# Patient Record
Sex: FEMALE | URBAN - METROPOLITAN AREA
[De-identification: names, ages, dates, MRNs, and addresses within clinical notes are randomized per-mention and may not be internally consistent; named-entity substitution may affect disease eponyms.]

---

## 2020-01-27 ENCOUNTER — APPOINTMENT (RX ONLY)
Dept: URBAN - METROPOLITAN AREA CLINIC 176 | Facility: CLINIC | Age: 70
Setting detail: DERMATOLOGY
End: 2020-01-27

## 2020-01-27 DIAGNOSIS — L81.4 OTHER MELANIN HYPERPIGMENTATION: ICD-10-CM

## 2020-01-27 DIAGNOSIS — D17 BENIGN LIPOMATOUS NEOPLASM: ICD-10-CM

## 2020-01-27 DIAGNOSIS — L72.0 EPIDERMAL CYST: ICD-10-CM

## 2020-01-27 DIAGNOSIS — L82.1 OTHER SEBORRHEIC KERATOSIS: ICD-10-CM

## 2020-01-27 DIAGNOSIS — Z85.828 PERSONAL HISTORY OF OTHER MALIGNANT NEOPLASM OF SKIN: ICD-10-CM

## 2020-01-27 DIAGNOSIS — L57.8 OTHER SKIN CHANGES DUE TO CHRONIC EXPOSURE TO NONIONIZING RADIATION: ICD-10-CM

## 2020-01-27 DIAGNOSIS — L73.8 OTHER SPECIFIED FOLLICULAR DISORDERS: ICD-10-CM

## 2020-01-27 DIAGNOSIS — D18.0 HEMANGIOMA: ICD-10-CM

## 2020-01-27 DIAGNOSIS — I78.8 OTHER DISEASES OF CAPILLARIES: ICD-10-CM

## 2020-01-27 DIAGNOSIS — D485 NEOPLASM OF UNCERTAIN BEHAVIOR OF SKIN: ICD-10-CM

## 2020-01-27 DIAGNOSIS — D22 MELANOCYTIC NEVI: ICD-10-CM

## 2020-01-27 PROBLEM — D22.5 MELANOCYTIC NEVI OF TRUNK: Status: ACTIVE | Noted: 2020-01-27

## 2020-01-27 PROBLEM — D17.21 BENIGN LIPOMATOUS NEOPLASM OF SKIN AND SUBCUTANEOUS TISSUE OF RIGHT ARM: Status: ACTIVE | Noted: 2020-01-27

## 2020-01-27 PROBLEM — D23.5 OTHER BENIGN NEOPLASM OF SKIN OF TRUNK: Status: ACTIVE | Noted: 2020-01-27

## 2020-01-27 PROBLEM — D48.5 NEOPLASM OF UNCERTAIN BEHAVIOR OF SKIN: Status: ACTIVE | Noted: 2020-01-27

## 2020-01-27 PROBLEM — D18.01 HEMANGIOMA OF SKIN AND SUBCUTANEOUS TISSUE: Status: ACTIVE | Noted: 2020-01-27

## 2020-01-27 PROCEDURE — ? COUNSELING

## 2020-01-27 PROCEDURE — ? SUNSCREEN RECOMMENDATIONS

## 2020-01-27 PROCEDURE — 99203 OFFICE O/P NEW LOW 30 MIN: CPT | Mod: 25

## 2020-01-27 PROCEDURE — 11300 SHAVE SKIN LESION 0.5 CM/<: CPT

## 2020-01-27 PROCEDURE — ? SHAVE REMOVAL

## 2020-01-27 ASSESSMENT — LOCATION ZONE DERM
LOCATION ZONE: ARM
LOCATION ZONE: TRUNK
LOCATION ZONE: FACE
LOCATION ZONE: EAR
LOCATION ZONE: LEG
LOCATION ZONE: NECK

## 2020-01-27 ASSESSMENT — LOCATION SIMPLE DESCRIPTION DERM
LOCATION SIMPLE: RIGHT UPPER ARM
LOCATION SIMPLE: LEFT CHEEK
LOCATION SIMPLE: ABDOMEN
LOCATION SIMPLE: LEFT UPPER BACK
LOCATION SIMPLE: NECK
LOCATION SIMPLE: RIGHT SHOULDER
LOCATION SIMPLE: RIGHT EAR
LOCATION SIMPLE: LEFT UPPER ARM
LOCATION SIMPLE: CHEST
LOCATION SIMPLE: RIGHT KNEE
LOCATION SIMPLE: RIGHT UPPER BACK
LOCATION SIMPLE: RIGHT PRETIBIAL REGION
LOCATION SIMPLE: RIGHT CHEEK
LOCATION SIMPLE: LEFT PRETIBIAL REGION
LOCATION SIMPLE: RIGHT CLAVICULAR SKIN

## 2020-01-27 ASSESSMENT — LOCATION DETAILED DESCRIPTION DERM
LOCATION DETAILED: LEFT PROXIMAL PRETIBIAL REGION
LOCATION DETAILED: RIGHT CENTRAL LATERAL NECK
LOCATION DETAILED: RIGHT MEDIAL UPPER BACK
LOCATION DETAILED: LEFT INFERIOR CENTRAL MALAR CHEEK
LOCATION DETAILED: RIGHT INFERIOR CENTRAL MALAR CHEEK
LOCATION DETAILED: LEFT ANTERIOR LATERAL PROXIMAL UPPER ARM
LOCATION DETAILED: RIGHT MEDIAL SUPERIOR CHEST
LOCATION DETAILED: RIGHT LATERAL SUPERIOR CHEST
LOCATION DETAILED: LEFT ANTERIOR PROXIMAL UPPER ARM
LOCATION DETAILED: LEFT SUPERIOR UPPER BACK
LOCATION DETAILED: RIGHT SUPERIOR HELIX
LOCATION DETAILED: RIGHT MID-UPPER BACK
LOCATION DETAILED: LEFT LATERAL ABDOMEN
LOCATION DETAILED: STERNAL NOTCH
LOCATION DETAILED: RIGHT CLAVICULAR SKIN
LOCATION DETAILED: RIGHT ANTERIOR DISTAL UPPER ARM
LOCATION DETAILED: RIGHT PROXIMAL PRETIBIAL REGION
LOCATION DETAILED: LEFT ANTERIOR DISTAL UPPER ARM
LOCATION DETAILED: RIGHT SUPERIOR MEDIAL UPPER BACK
LOCATION DETAILED: LEFT LATERAL SUPERIOR CHEST
LOCATION DETAILED: RIGHT ANTERIOR SHOULDER
LOCATION DETAILED: EPIGASTRIC SKIN
LOCATION DETAILED: UPPER STERNUM
LOCATION DETAILED: RIGHT RIB CAGE
LOCATION DETAILED: RIGHT ANTERIOR PROXIMAL UPPER ARM
LOCATION DETAILED: RIGHT KNEE

## 2020-01-27 NOTE — PROCEDURE: SHAVE REMOVAL
Biopsy Method: Dermablade
Post-Care Instructions: I reviewed with the patient in detail post-care instructions. Patient is to keep the biopsy site dry overnight, cleanse in morning and apply vaseline and a bandage daily x 1 week.
Medical Necessity Clause: This procedure was medically necessary because the lesion that was treated was:
Medical Necessity Information: It is in your best interest to select a reason for this procedure from the list below. All of these items fulfill various CMS LCD requirements except the new and changing color options.
Notification Instructions: Patient will be notified of biopsy results. However, patient instructed to call the office if not contacted within 2 weeks.
Hemostasis: Drysol
Add Variable For Additional Medical Justification: No
Detail Level: Detailed
Size Of Lesion In Cm (Required): 0.4
Consent was obtained from the patient. The risks and benefits to therapy were discussed in detail. Specifically, the risks of infection, scarring, bleeding, prolonged wound healing, incomplete removal, allergy to anesthesia, nerve injury and recurrence were addressed. Prior to the procedure, the treatment site was clearly identified and confirmed by the patient. All components of Universal Protocol/PAUSE Rule completed.
Path Notes (To The Dermatopathologist): Please check margins.
Billing Type: Third-Party Bill
Render Post-Care Instructions In Note?: yes
Lab: [id number]
Anesthesia Type: 1% lidocaine with epinephrine
Wound Care: Polysporin ointment
Lab Facility: [id number]

## 2021-02-03 ENCOUNTER — APPOINTMENT (RX ONLY)
Dept: URBAN - METROPOLITAN AREA CLINIC 176 | Facility: CLINIC | Age: 71
Setting detail: DERMATOLOGY
End: 2021-02-03

## 2021-02-03 VITALS — HEIGHT: 62.5 IN | WEIGHT: 122 LBS

## 2021-02-03 DIAGNOSIS — L81.4 OTHER MELANIN HYPERPIGMENTATION: ICD-10-CM

## 2021-02-03 DIAGNOSIS — L73.8 OTHER SPECIFIED FOLLICULAR DISORDERS: ICD-10-CM

## 2021-02-03 DIAGNOSIS — D18.0 HEMANGIOMA: ICD-10-CM

## 2021-02-03 DIAGNOSIS — L72.0 EPIDERMAL CYST: ICD-10-CM

## 2021-02-03 DIAGNOSIS — D17 BENIGN LIPOMATOUS NEOPLASM: ICD-10-CM

## 2021-02-03 DIAGNOSIS — D22 MELANOCYTIC NEVI: ICD-10-CM

## 2021-02-03 DIAGNOSIS — I78.8 OTHER DISEASES OF CAPILLARIES: ICD-10-CM

## 2021-02-03 DIAGNOSIS — L57.8 OTHER SKIN CHANGES DUE TO CHRONIC EXPOSURE TO NONIONIZING RADIATION: ICD-10-CM

## 2021-02-03 DIAGNOSIS — L82.1 OTHER SEBORRHEIC KERATOSIS: ICD-10-CM

## 2021-02-03 DIAGNOSIS — Z85.828 PERSONAL HISTORY OF OTHER MALIGNANT NEOPLASM OF SKIN: ICD-10-CM

## 2021-02-03 PROBLEM — D17.21 BENIGN LIPOMATOUS NEOPLASM OF SKIN AND SUBCUTANEOUS TISSUE OF RIGHT ARM: Status: ACTIVE | Noted: 2021-02-03

## 2021-02-03 PROBLEM — D22.5 MELANOCYTIC NEVI OF TRUNK: Status: ACTIVE | Noted: 2021-02-03

## 2021-02-03 PROBLEM — D23.5 OTHER BENIGN NEOPLASM OF SKIN OF TRUNK: Status: ACTIVE | Noted: 2021-02-03

## 2021-02-03 PROBLEM — D18.01 HEMANGIOMA OF SKIN AND SUBCUTANEOUS TISSUE: Status: ACTIVE | Noted: 2021-02-03

## 2021-02-03 PROCEDURE — ? COUNSELING

## 2021-02-03 PROCEDURE — 99213 OFFICE O/P EST LOW 20 MIN: CPT

## 2021-02-03 PROCEDURE — ? BENIGN DESTRUCTION COSMETIC

## 2021-02-03 ASSESSMENT — LOCATION DETAILED DESCRIPTION DERM
LOCATION DETAILED: LEFT MEDIAL MALAR CHEEK
LOCATION DETAILED: LEFT INFERIOR LATERAL MALAR CHEEK
LOCATION DETAILED: LEFT LATERAL SUPERIOR CHEST
LOCATION DETAILED: LEFT LATERAL MALAR CHEEK
LOCATION DETAILED: RIGHT ANTERIOR PROXIMAL UPPER ARM
LOCATION DETAILED: LEFT PROXIMAL PRETIBIAL REGION
LOCATION DETAILED: STERNAL NOTCH
LOCATION DETAILED: RIGHT MID-UPPER BACK
LOCATION DETAILED: LEFT MID PREAURICULAR CHEEK
LOCATION DETAILED: RIGHT SUPERIOR HELIX
LOCATION DETAILED: UPPER STERNUM
LOCATION DETAILED: RIGHT CENTRAL MALAR CHEEK
LOCATION DETAILED: LEFT SUPERIOR FOREHEAD
LOCATION DETAILED: RIGHT PROXIMAL PRETIBIAL REGION
LOCATION DETAILED: LEFT LATERAL ABDOMEN
LOCATION DETAILED: RIGHT LATERAL SUPERIOR CHEST
LOCATION DETAILED: RIGHT KNEE
LOCATION DETAILED: RIGHT CENTRAL LATERAL NECK
LOCATION DETAILED: LEFT SUPERIOR UPPER BACK
LOCATION DETAILED: LEFT ANTERIOR DISTAL UPPER ARM
LOCATION DETAILED: RIGHT ANTERIOR SHOULDER
LOCATION DETAILED: RIGHT MEDIAL MALAR CHEEK
LOCATION DETAILED: RIGHT SUPERIOR MEDIAL FOREHEAD
LOCATION DETAILED: EPIGASTRIC SKIN
LOCATION DETAILED: LEFT LATERAL FOREHEAD
LOCATION DETAILED: RIGHT SUPERIOR MEDIAL UPPER BACK
LOCATION DETAILED: LEFT MEDIAL FOREHEAD
LOCATION DETAILED: LEFT ANTERIOR PROXIMAL UPPER ARM
LOCATION DETAILED: LEFT INFERIOR CENTRAL MALAR CHEEK
LOCATION DETAILED: LEFT ANTERIOR LATERAL PROXIMAL UPPER ARM
LOCATION DETAILED: RIGHT INFERIOR CENTRAL MALAR CHEEK
LOCATION DETAILED: RIGHT MEDIAL SUPERIOR CHEST
LOCATION DETAILED: RIGHT SUPERIOR MEDIAL BUCCAL CHEEK
LOCATION DETAILED: MIDDLE STERNUM
LOCATION DETAILED: GLABELLA
LOCATION DETAILED: RIGHT ANTERIOR DISTAL UPPER ARM
LOCATION DETAILED: RIGHT MEDIAL UPPER BACK
LOCATION DETAILED: RIGHT INFERIOR FOREHEAD
LOCATION DETAILED: RIGHT RIB CAGE
LOCATION DETAILED: LEFT INFERIOR MEDIAL FOREHEAD
LOCATION DETAILED: RIGHT SUPERIOR FOREHEAD

## 2021-02-03 ASSESSMENT — LOCATION SIMPLE DESCRIPTION DERM
LOCATION SIMPLE: RIGHT EAR
LOCATION SIMPLE: ABDOMEN
LOCATION SIMPLE: RIGHT UPPER BACK
LOCATION SIMPLE: LEFT CHEEK
LOCATION SIMPLE: RIGHT FOREHEAD
LOCATION SIMPLE: RIGHT KNEE
LOCATION SIMPLE: GLABELLA
LOCATION SIMPLE: RIGHT CHEEK
LOCATION SIMPLE: LEFT UPPER ARM
LOCATION SIMPLE: LEFT UPPER BACK
LOCATION SIMPLE: LEFT FOREHEAD
LOCATION SIMPLE: CHEST
LOCATION SIMPLE: RIGHT PRETIBIAL REGION
LOCATION SIMPLE: LEFT PRETIBIAL REGION
LOCATION SIMPLE: RIGHT SHOULDER
LOCATION SIMPLE: RIGHT UPPER ARM
LOCATION SIMPLE: NECK

## 2021-02-03 ASSESSMENT — LOCATION ZONE DERM
LOCATION ZONE: ARM
LOCATION ZONE: FACE
LOCATION ZONE: TRUNK
LOCATION ZONE: LEG
LOCATION ZONE: NECK
LOCATION ZONE: EAR

## 2021-02-03 NOTE — PROCEDURE: BENIGN DESTRUCTION COSMETIC
Detail Level: Detailed
Anesthesia Volume In Cc: 0.2
Anesthesia Type: 1% lidocaine with epinephrine
Post-Care Instructions: I reviewed with the patient in detail post-care instructions. Patient is to wear sunprotection, and avoid picking at any of the treated lesions. Pt may apply Vaseline to crusted or scabbing areas.
Price (Use Numbers Only, No Special Characters Or $): 150
Consent: The patient's consent was obtained including but not limited to risks of crusting, scabbing, blistering, scarring, darker or lighter pigmentary change, recurrence, incomplete removal and infection.

## 2021-02-03 NOTE — PROCEDURE: COUNSELING
Detail Level: Generalized
Sunscreen Recommendations: spf 30 broad spectrum, reapply q 90 min
Detail Level: Detailed
Detail Level: Simple
Detail Level: Zone
Patient Specific Counseling (Will Not Stick From Patient To Patient): LN2 no extra charge

## 2021-08-31 ENCOUNTER — APPOINTMENT (RX ONLY)
Dept: URBAN - METROPOLITAN AREA CLINIC 176 | Facility: CLINIC | Age: 71
Setting detail: DERMATOLOGY
End: 2021-08-31

## 2021-08-31 VITALS — HEIGHT: 63 IN | WEIGHT: 125 LBS

## 2021-08-31 DIAGNOSIS — B35.1 TINEA UNGUIUM: ICD-10-CM

## 2021-08-31 DIAGNOSIS — L57.8 OTHER SKIN CHANGES DUE TO CHRONIC EXPOSURE TO NONIONIZING RADIATION: ICD-10-CM | Status: STABLE

## 2021-08-31 PROCEDURE — ? NAIL CLIPPING FOR PAS

## 2021-08-31 PROCEDURE — 99213 OFFICE O/P EST LOW 20 MIN: CPT

## 2021-08-31 PROCEDURE — ? COUNSELING

## 2021-08-31 ASSESSMENT — LOCATION ZONE DERM
LOCATION ZONE: FINGERNAIL
LOCATION ZONE: FACE

## 2021-08-31 ASSESSMENT — LOCATION SIMPLE DESCRIPTION DERM
LOCATION SIMPLE: LEFT CHEEK
LOCATION SIMPLE: RIGHT INDEX FINGERNAIL

## 2021-08-31 ASSESSMENT — LOCATION DETAILED DESCRIPTION DERM
LOCATION DETAILED: LEFT INFERIOR CENTRAL MALAR CHEEK
LOCATION DETAILED: RIGHT INDEX FINGERNAIL

## 2021-08-31 NOTE — PROCEDURE: NAIL CLIPPING FOR PAS
Detail Level: Detailed
Billing Type: Third-Party Bill
Add [id number] To Bill?: No
Lab: [id number]
Lab Facility: [id number]

## 2021-08-31 NOTE — HPI: NAIL DYSTROPHY
How Severe Is It?: moderate
Is This A New Presentation, Or A Follow-Up?: Nail Dystrophy
Additional History: Patient tried terbinafine from pcp and was allergic to it

## 2021-08-31 NOTE — PROCEDURE: COUNSELING
Detail Level: Zone
Detail Level: Detailed
Patient Specific Counseling (Will Not Stick From Patient To Patient): Advised patient to stop messing with her cuticles as it takes 6 months for nails to return back to normal.\\nJUBLIA IF CULTURE POSITIVE- DEVELOPED AFTER DEEP CLEANING HER HOUSE AND USING BLEACH.

## 2021-09-03 ENCOUNTER — RX ONLY (OUTPATIENT)
Age: 71
Setting detail: RX ONLY
End: 2021-09-03

## 2021-09-03 RX ORDER — EFINACONAZOLE 100 MG/ML
SOLUTION TOPICAL
Qty: 4 | Refills: 0 | Status: ERX | COMMUNITY
Start: 2021-09-03

## 2021-09-08 ENCOUNTER — APPOINTMENT (RX ONLY)
Dept: URBAN - METROPOLITAN AREA CLINIC 176 | Facility: CLINIC | Age: 71
Setting detail: DERMATOLOGY
End: 2021-09-08

## 2021-09-08 DIAGNOSIS — B35.1 TINEA UNGUIUM: ICD-10-CM

## 2021-09-08 PROCEDURE — ? ORDER TESTS

## 2021-09-08 NOTE — PROCEDURE: ORDER TESTS
Performing Laboratory: [id number]
Expected Date Of Service: 09/08/2021
Billing Type: Third-Party Bill
Bill For Surgical Tray: no

## 2022-10-17 ENCOUNTER — APPOINTMENT (RX ONLY)
Dept: URBAN - METROPOLITAN AREA CLINIC 176 | Facility: CLINIC | Age: 72
Setting detail: DERMATOLOGY
End: 2022-10-17

## 2022-10-17 VITALS — HEIGHT: 63 IN | WEIGHT: 125 LBS

## 2022-10-17 DIAGNOSIS — L57.8 OTHER SKIN CHANGES DUE TO CHRONIC EXPOSURE TO NONIONIZING RADIATION: ICD-10-CM

## 2022-10-17 DIAGNOSIS — D18.0 HEMANGIOMA: ICD-10-CM

## 2022-10-17 DIAGNOSIS — L71.8 OTHER ROSACEA: ICD-10-CM

## 2022-10-17 DIAGNOSIS — Z85.828 PERSONAL HISTORY OF OTHER MALIGNANT NEOPLASM OF SKIN: ICD-10-CM

## 2022-10-17 DIAGNOSIS — I78.8 OTHER DISEASES OF CAPILLARIES: ICD-10-CM

## 2022-10-17 DIAGNOSIS — L81.4 OTHER MELANIN HYPERPIGMENTATION: ICD-10-CM

## 2022-10-17 DIAGNOSIS — L82.1 OTHER SEBORRHEIC KERATOSIS: ICD-10-CM

## 2022-10-17 DIAGNOSIS — D17 BENIGN LIPOMATOUS NEOPLASM: ICD-10-CM

## 2022-10-17 DIAGNOSIS — B35.1 TINEA UNGUIUM: ICD-10-CM | Status: RESOLVING

## 2022-10-17 DIAGNOSIS — D22 MELANOCYTIC NEVI: ICD-10-CM

## 2022-10-17 PROBLEM — D23.5 OTHER BENIGN NEOPLASM OF SKIN OF TRUNK: Status: ACTIVE | Noted: 2022-10-17

## 2022-10-17 PROBLEM — D22.5 MELANOCYTIC NEVI OF TRUNK: Status: ACTIVE | Noted: 2022-10-17

## 2022-10-17 PROBLEM — D17.21 BENIGN LIPOMATOUS NEOPLASM OF SKIN AND SUBCUTANEOUS TISSUE OF RIGHT ARM: Status: ACTIVE | Noted: 2022-10-17

## 2022-10-17 PROBLEM — D18.01 HEMANGIOMA OF SKIN AND SUBCUTANEOUS TISSUE: Status: ACTIVE | Noted: 2022-10-17

## 2022-10-17 PROCEDURE — ? TREATMENT REGIMEN

## 2022-10-17 PROCEDURE — ? PRESCRIPTION

## 2022-10-17 PROCEDURE — ? COUNSELING

## 2022-10-17 PROCEDURE — ? PHOTO-DOCUMENTATION

## 2022-10-17 PROCEDURE — 99213 OFFICE O/P EST LOW 20 MIN: CPT

## 2022-10-17 RX ORDER — TACROLIMUS 1 MG/G
OINTMENT TOPICAL
Qty: 30 | Refills: 2 | Status: ERX | COMMUNITY
Start: 2022-10-17

## 2022-10-17 RX ADMIN — TACROLIMUS: 1 OINTMENT TOPICAL at 00:00

## 2022-10-17 ASSESSMENT — LOCATION DETAILED DESCRIPTION DERM
LOCATION DETAILED: RIGHT MID-UPPER BACK
LOCATION DETAILED: LEFT ANTERIOR DISTAL UPPER ARM
LOCATION DETAILED: UPPER STERNUM
LOCATION DETAILED: RIGHT SUPERIOR MEDIAL UPPER BACK
LOCATION DETAILED: EPIGASTRIC SKIN
LOCATION DETAILED: RIGHT KNEE
LOCATION DETAILED: RIGHT CENTRAL EYEBROW
LOCATION DETAILED: LEFT INFERIOR LATERAL MALAR CHEEK
LOCATION DETAILED: LEFT LATERAL ABDOMEN
LOCATION DETAILED: RIGHT LATERAL SUPERIOR CHEST
LOCATION DETAILED: LEFT MID PREAURICULAR CHEEK
LOCATION DETAILED: RIGHT ANTERIOR PROXIMAL UPPER ARM
LOCATION DETAILED: RIGHT SUPERIOR HELIX
LOCATION DETAILED: LEFT ANTERIOR PROXIMAL UPPER ARM
LOCATION DETAILED: LEFT MEDIAL FOREHEAD
LOCATION DETAILED: LEFT SUPERIOR UPPER BACK
LOCATION DETAILED: RIGHT CENTRAL LATERAL NECK
LOCATION DETAILED: MIDDLE STERNUM
LOCATION DETAILED: RIGHT INDEX FINGERNAIL
LOCATION DETAILED: LEFT PROXIMAL PRETIBIAL REGION
LOCATION DETAILED: LEFT CENTRAL EYEBROW
LOCATION DETAILED: RIGHT PROXIMAL PRETIBIAL REGION
LOCATION DETAILED: LEFT INFERIOR CENTRAL MALAR CHEEK
LOCATION DETAILED: RIGHT MEDIAL UPPER BACK
LOCATION DETAILED: STERNAL NOTCH
LOCATION DETAILED: RIGHT ANTERIOR SHOULDER
LOCATION DETAILED: LEFT ANTERIOR LATERAL PROXIMAL UPPER ARM
LOCATION DETAILED: RIGHT RIB CAGE
LOCATION DETAILED: RIGHT INFERIOR CENTRAL MALAR CHEEK
LOCATION DETAILED: RIGHT ANTERIOR DISTAL UPPER ARM

## 2022-10-17 ASSESSMENT — LOCATION ZONE DERM
LOCATION ZONE: FINGERNAIL
LOCATION ZONE: EAR
LOCATION ZONE: FACE
LOCATION ZONE: ARM
LOCATION ZONE: LEG
LOCATION ZONE: NECK
LOCATION ZONE: TRUNK

## 2022-10-17 ASSESSMENT — LOCATION SIMPLE DESCRIPTION DERM
LOCATION SIMPLE: RIGHT UPPER ARM
LOCATION SIMPLE: RIGHT CHEEK
LOCATION SIMPLE: LEFT UPPER BACK
LOCATION SIMPLE: RIGHT INDEX FINGERNAIL
LOCATION SIMPLE: RIGHT PRETIBIAL REGION
LOCATION SIMPLE: LEFT CHEEK
LOCATION SIMPLE: RIGHT KNEE
LOCATION SIMPLE: LEFT FOREHEAD
LOCATION SIMPLE: NECK
LOCATION SIMPLE: RIGHT EYEBROW
LOCATION SIMPLE: LEFT UPPER ARM
LOCATION SIMPLE: RIGHT EAR
LOCATION SIMPLE: CHEST
LOCATION SIMPLE: LEFT EYEBROW
LOCATION SIMPLE: RIGHT SHOULDER
LOCATION SIMPLE: RIGHT UPPER BACK
LOCATION SIMPLE: ABDOMEN
LOCATION SIMPLE: LEFT PRETIBIAL REGION

## 2022-10-17 NOTE — PROCEDURE: TREATMENT REGIMEN
Initiate Treatment: Tacrolimus
Plan: Patient does not have a history of lupus. If not better with tacrolimus will switch to Opzelura. Patient can not identify any triggers.
Detail Level: Zone
Plan: Patient states she does not need any refills on Jublia. Advised patient to refrain from manipulating the nails but patient doesn’t do anything to her nails.

## 2022-10-17 NOTE — PROCEDURE: COUNSELING
Detail Level: Zone
Sunscreen Recommendations: spf 30 broad spectrum, reapply q 90 min
Detail Level: Detailed
Detail Level: Simple
Detail Level: Generalized

## 2023-04-12 ENCOUNTER — APPOINTMENT (RX ONLY)
Dept: URBAN - METROPOLITAN AREA CLINIC 176 | Facility: CLINIC | Age: 73
Setting detail: DERMATOLOGY
End: 2023-04-12

## 2023-04-12 VITALS — HEIGHT: 63 IN | WEIGHT: 120 LBS

## 2023-04-12 DIAGNOSIS — L72.3 SEBACEOUS CYST: ICD-10-CM

## 2023-04-12 PROBLEM — D48.5 NEOPLASM OF UNCERTAIN BEHAVIOR OF SKIN: Status: ACTIVE | Noted: 2023-04-12

## 2023-04-12 PROCEDURE — ? PRESCRIPTION MEDICATION MANAGEMENT

## 2023-04-12 PROCEDURE — ? PRESCRIPTION

## 2023-04-12 PROCEDURE — ? INTRALESIONAL KENALOG

## 2023-04-12 PROCEDURE — 11900 INJECT SKIN LESIONS </W 7: CPT

## 2023-04-12 PROCEDURE — ? ORDER TESTS

## 2023-04-12 PROCEDURE — ? COUNSELING

## 2023-04-12 RX ORDER — DOXYCYCLINE HYCLATE 100 MG/1
CAPSULE, GELATIN COATED ORAL BID
Qty: 28 | Refills: 0 | Status: ERX

## 2023-04-12 ASSESSMENT — LOCATION SIMPLE DESCRIPTION DERM: LOCATION SIMPLE: LEFT CHEEK

## 2023-04-12 ASSESSMENT — LOCATION ZONE DERM: LOCATION ZONE: FACE

## 2023-04-12 ASSESSMENT — LOCATION DETAILED DESCRIPTION DERM: LOCATION DETAILED: LEFT SUPERIOR CENTRAL BUCCAL CHEEK

## 2023-04-12 NOTE — PROCEDURE: ORDER TESTS
Performing Laboratory: [id number]
Expected Date Of Service: 04/12/2023
Billing Type: Third-Party Bill
Bill For Surgical Tray: no
Lab Facility: [id number]

## 2023-04-12 NOTE — PROCEDURE: INTRALESIONAL KENALOG
Lot # (Optional): [id number]
How Many Mls Were Removed From The 80 Mg/Ml (5ml) Vial When Preparing The Injectable Solution?: 0
Treatment Number (Optional): 1
Medical Necessity Clause: This procedure was medically necessary because the lesions that were treated were:
Concentration Of Solution Injected (Mg/Ml): 3.0
Include Z78.9 (Other Specified Conditions Influencing Health Status) As An Associated Diagnosis?: No
Detail Level: Detailed
Validate Note Data When Using Inventory: Yes
Expiration Date (Optional): 09/01/2023
Total Volume Injected (Ccs- Only Use Numbers And Decimals): 0.2
Kenalog Preparation: Kenalog
Consent: The risks of atrophy were reviewed with the patient.

## 2023-04-12 NOTE — PROCEDURE: PRESCRIPTION MEDICATION MANAGEMENT
Render In Strict Bullet Format?: No
Detail Level: Zone
Initiate Treatment: Doxycycline 100mg twice daily x 2 weeks

## 2023-05-16 ENCOUNTER — APPOINTMENT (RX ONLY)
Dept: URBAN - METROPOLITAN AREA CLINIC 176 | Facility: CLINIC | Age: 73
Setting detail: DERMATOLOGY
End: 2023-05-16

## 2023-05-16 VITALS — WEIGHT: 120 LBS | HEIGHT: 62 IN

## 2023-05-16 DIAGNOSIS — K13.0 DISEASES OF LIPS: ICD-10-CM

## 2023-05-16 DIAGNOSIS — L72.3 SEBACEOUS CYST: ICD-10-CM

## 2023-05-16 PROBLEM — D48.5 NEOPLASM OF UNCERTAIN BEHAVIOR OF SKIN: Status: ACTIVE | Noted: 2023-05-16

## 2023-05-16 PROCEDURE — ? BIOPSY BY PUNCH METHOD

## 2023-05-16 PROCEDURE — ? PRESCRIPTION MEDICATION MANAGEMENT

## 2023-05-16 PROCEDURE — 99212 OFFICE O/P EST SF 10 MIN: CPT | Mod: 25

## 2023-05-16 PROCEDURE — ? COUNSELING

## 2023-05-16 PROCEDURE — 11104 PUNCH BX SKIN SINGLE LESION: CPT

## 2023-05-16 ASSESSMENT — LOCATION ZONE DERM
LOCATION ZONE: FACE
LOCATION ZONE: LIP

## 2023-05-16 ASSESSMENT — LOCATION SIMPLE DESCRIPTION DERM
LOCATION SIMPLE: RIGHT LIP
LOCATION SIMPLE: LEFT CHEEK

## 2023-05-16 ASSESSMENT — LOCATION DETAILED DESCRIPTION DERM
LOCATION DETAILED: RIGHT SUPERIOR VERMILION LIP
LOCATION DETAILED: LEFT SUPERIOR CENTRAL BUCCAL CHEEK

## 2023-05-16 NOTE — PROCEDURE: BIOPSY BY PUNCH METHOD
Detail Level: Detailed
Was A Bandage Applied: Yes
Punch Size In Mm: 2
Size Of Lesion In Cm (Optional): 0.8
X Size Of Lesion In Cm (Optional): 0
Depth Of Punch Biopsy: dermis
Biopsy Type: H and E
Anesthesia Type: 0.5% lidocaine with 1:400,000 epinephrine
Anesthesia Volume In Cc (Will Not Render If 0): 0.5
Hemostasis: None
Epidermal Sutures: 6-0 Prolene
Wound Care: Petrolatum
Dressing: bandage
Suture Removal: 14 days
Patient Will Remove Sutures At Home?: No
Lab: [id number]
Lab Facility: [id number]
Consent: Written consent was obtained and risks were reviewed including but not limited to scarring, infection, bleeding, scabbing, incomplete removal, nerve damage and allergy to anesthesia.
Post-Care Instructions: I reviewed with the patient in detail post-care instructions. Patient is to keep the biopsy site dry overnight, and then apply bacitracin twice daily until healed. Patient may apply hydrogen peroxide soaks to remove any crusting.
Home Suture Removal Text: Patient was provided a home suture removal kit and will remove their sutures at home.  If they have any questions or difficulties they will call the office.
Notification Instructions: Patient will be notified of biopsy results. However, patient instructed to call the office if not contacted within 2 weeks.
Billing Type: Third-Party Bill
Information: Selecting Yes will display possible errors in your note based on the variables you have selected. This validation is only offered as a suggestion for you. PLEASE NOTE THAT THE VALIDATION TEXT WILL BE REMOVED WHEN YOU FINALIZE YOUR NOTE. IF YOU WANT TO FAX A PRELIMINARY NOTE YOU WILL NEED TO TOGGLE THIS TO 'NO' IF YOU DO NOT WANT IT IN YOUR FAXED NOTE.

## 2023-05-23 ENCOUNTER — APPOINTMENT (RX ONLY)
Dept: URBAN - METROPOLITAN AREA CLINIC 176 | Facility: CLINIC | Age: 73
Setting detail: DERMATOLOGY
End: 2023-05-23

## 2023-05-23 VITALS — HEIGHT: 62 IN | WEIGHT: 120 LBS

## 2023-05-23 DIAGNOSIS — Z48.02 ENCOUNTER FOR REMOVAL OF SUTURES: ICD-10-CM

## 2023-05-23 PROCEDURE — ? SUTURE REMOVAL (GLOBAL PERIOD)

## 2023-05-23 PROCEDURE — 99024 POSTOP FOLLOW-UP VISIT: CPT

## 2023-05-23 ASSESSMENT — LOCATION SIMPLE DESCRIPTION DERM: LOCATION SIMPLE: LEFT CHEEK

## 2023-05-23 ASSESSMENT — LOCATION DETAILED DESCRIPTION DERM: LOCATION DETAILED: LEFT SUPERIOR CENTRAL BUCCAL CHEEK

## 2023-05-23 ASSESSMENT — LOCATION ZONE DERM: LOCATION ZONE: FACE

## 2023-05-23 NOTE — PROCEDURE: SUTURE REMOVAL (GLOBAL PERIOD)
Detail Level: Detailed
Add 04483 Cpt? (Important Note: In 2017 The Use Of 92963 Is Being Tracked By Cms To Determine Future Global Period Reimbursement For Global Periods): yes

## 2023-07-13 ENCOUNTER — APPOINTMENT (RX ONLY)
Dept: URBAN - METROPOLITAN AREA CLINIC 176 | Facility: CLINIC | Age: 73
Setting detail: DERMATOLOGY
End: 2023-07-13

## 2023-07-13 VITALS — HEIGHT: 62 IN | WEIGHT: 120 LBS

## 2023-07-13 DIAGNOSIS — L57.8 OTHER SKIN CHANGES DUE TO CHRONIC EXPOSURE TO NONIONIZING RADIATION: ICD-10-CM

## 2023-07-13 DIAGNOSIS — L71.8 OTHER ROSACEA: ICD-10-CM

## 2023-07-13 PROCEDURE — ? COUNSELING

## 2023-07-13 PROCEDURE — ? PHOTO-DOCUMENTATION

## 2023-07-13 PROCEDURE — ? PRESCRIPTION

## 2023-07-13 PROCEDURE — 99213 OFFICE O/P EST LOW 20 MIN: CPT

## 2023-07-13 PROCEDURE — ? TREATMENT REGIMEN

## 2023-07-13 RX ORDER — DOXYCYCLINE HYCLATE 50 MG/1
TABLET, FILM COATED ORAL
Qty: 90 | Refills: 1 | Status: ERX | COMMUNITY
Start: 2023-07-13

## 2023-07-13 RX ADMIN — DOXYCYCLINE HYCLATE: 50 TABLET, FILM COATED ORAL at 00:00

## 2023-07-13 ASSESSMENT — LOCATION DETAILED DESCRIPTION DERM
LOCATION DETAILED: MIDDLE STERNUM
LOCATION DETAILED: LEFT IRIS
LOCATION DETAILED: LEFT MEDIAL FOREHEAD
LOCATION DETAILED: RIGHT CENTRAL EYEBROW
LOCATION DETAILED: LEFT CENTRAL EYEBROW
LOCATION DETAILED: RIGHT INFERIOR CENTRAL MALAR CHEEK
LOCATION DETAILED: RIGHT LATERAL SCLERA

## 2023-07-13 ASSESSMENT — LOCATION ZONE DERM
LOCATION ZONE: FACE
LOCATION ZONE: CONJUNCTIVA
LOCATION ZONE: TRUNK
LOCATION ZONE: CORNEA

## 2023-07-13 ASSESSMENT — LOCATION SIMPLE DESCRIPTION DERM
LOCATION SIMPLE: RIGHT CHEEK
LOCATION SIMPLE: RIGHT EYE
LOCATION SIMPLE: LEFT FOREHEAD
LOCATION SIMPLE: LEFT EYEBROW
LOCATION SIMPLE: CHEST
LOCATION SIMPLE: RIGHT EYEBROW
LOCATION SIMPLE: LEFT EYE

## 2023-07-13 NOTE — PROCEDURE: TREATMENT REGIMEN
Continue Regimen: Metronidazole gel
Plan: Patient does not have a history of lupus.
Detail Level: Zone

## 2023-07-13 NOTE — PROCEDURE: MIPS QUALITY
Quality 431: Preventive Care And Screening: Unhealthy Alcohol Use - Screening: Patient not identified as an unhealthy alcohol user when screened for unhealthy alcohol use using a systematic screening method
Quality 402: Tobacco Use And Help With Quitting Among Adolescents: Patient screened for tobacco and never smoked
Quality 130: Documentation Of Current Medications In The Medical Record: Current Medications Documented
Detail Level: Detailed
Quality 47: Advance Care Plan: Advance Care Planning discussed and documented; advance care plan or surrogate decision maker documented in the medical record.
Quality 110: Preventive Care And Screening: Influenza Immunization: Influenza Immunization not Administered because Patient Refused.
Quality 111:Pneumonia Vaccination Status For Older Adults: Patient received any pneumococcal conjugate or polysaccharide vaccine on or after their 60th birthday and before the end of the measurement period
Quality 394b: Td/Tdap Immunizations For Adolescents: Patient had one tetanus, diphtheria toxoids and acellular pertussis vaccine (Tdap) on or between the patient's 10th and 13th birthdays.

## 2023-11-29 ENCOUNTER — APPOINTMENT (RX ONLY)
Dept: URBAN - METROPOLITAN AREA CLINIC 176 | Facility: CLINIC | Age: 73
Setting detail: DERMATOLOGY
End: 2023-11-29

## 2023-11-29 DIAGNOSIS — L81.4 OTHER MELANIN HYPERPIGMENTATION: ICD-10-CM

## 2023-11-29 DIAGNOSIS — L57.8 OTHER SKIN CHANGES DUE TO CHRONIC EXPOSURE TO NONIONIZING RADIATION: ICD-10-CM

## 2023-11-29 DIAGNOSIS — M67.4 GANGLION: ICD-10-CM

## 2023-11-29 DIAGNOSIS — Z85.828 PERSONAL HISTORY OF OTHER MALIGNANT NEOPLASM OF SKIN: ICD-10-CM

## 2023-11-29 DIAGNOSIS — I78.8 OTHER DISEASES OF CAPILLARIES: ICD-10-CM

## 2023-11-29 DIAGNOSIS — L82.1 OTHER SEBORRHEIC KERATOSIS: ICD-10-CM

## 2023-11-29 DIAGNOSIS — L60.8 OTHER NAIL DISORDERS: ICD-10-CM | Status: INADEQUATELY CONTROLLED

## 2023-11-29 DIAGNOSIS — L71.8 OTHER ROSACEA: ICD-10-CM

## 2023-11-29 DIAGNOSIS — D22 MELANOCYTIC NEVI: ICD-10-CM

## 2023-11-29 DIAGNOSIS — D17 BENIGN LIPOMATOUS NEOPLASM: ICD-10-CM

## 2023-11-29 DIAGNOSIS — D18.0 HEMANGIOMA: ICD-10-CM

## 2023-11-29 PROBLEM — D22.5 MELANOCYTIC NEVI OF TRUNK: Status: ACTIVE | Noted: 2023-11-29

## 2023-11-29 PROBLEM — M67.40 GANGLION, UNSPECIFIED SITE: Status: ACTIVE | Noted: 2023-11-29

## 2023-11-29 PROBLEM — D18.01 HEMANGIOMA OF SKIN AND SUBCUTANEOUS TISSUE: Status: ACTIVE | Noted: 2023-11-29

## 2023-11-29 PROBLEM — D23.5 OTHER BENIGN NEOPLASM OF SKIN OF TRUNK: Status: ACTIVE | Noted: 2023-11-29

## 2023-11-29 PROBLEM — D17.21 BENIGN LIPOMATOUS NEOPLASM OF SKIN AND SUBCUTANEOUS TISSUE OF RIGHT ARM: Status: ACTIVE | Noted: 2023-11-29

## 2023-11-29 PROCEDURE — ? PRESCRIPTION

## 2023-11-29 PROCEDURE — ? COUNSELING

## 2023-11-29 PROCEDURE — 99214 OFFICE O/P EST MOD 30 MIN: CPT

## 2023-11-29 RX ORDER — DOXYCYCLINE HYCLATE 50 MG/1
TABLET, FILM COATED ORAL
Qty: 90 | Refills: 1 | Status: ERX

## 2023-11-29 RX ORDER — CIPROFLOXACIN 0.5 MG/.25ML
SOLUTION/ DROPS AURICULAR (OTIC)
Qty: 1 | Refills: 2 | Status: ERX | COMMUNITY
Start: 2023-11-29

## 2023-11-29 RX ADMIN — CIPROFLOXACIN: 0.5 SOLUTION/ DROPS AURICULAR (OTIC) at 00:00

## 2023-11-29 ASSESSMENT — LOCATION ZONE DERM
LOCATION ZONE: LEG
LOCATION ZONE: FACE
LOCATION ZONE: TRUNK
LOCATION ZONE: ARM
LOCATION ZONE: CONJUNCTIVA
LOCATION ZONE: FINGERNAIL
LOCATION ZONE: EAR
LOCATION ZONE: CORNEA
LOCATION ZONE: NECK

## 2023-11-29 ASSESSMENT — LOCATION DETAILED DESCRIPTION DERM
LOCATION DETAILED: LEFT PROXIMAL PRETIBIAL REGION
LOCATION DETAILED: RIGHT CENTRAL LATERAL NECK
LOCATION DETAILED: RIGHT RIB CAGE
LOCATION DETAILED: RIGHT SUPERIOR MEDIAL UPPER BACK
LOCATION DETAILED: LEFT SUPERIOR UPPER BACK
LOCATION DETAILED: LEFT INFERIOR CENTRAL MALAR CHEEK
LOCATION DETAILED: LEFT LATERAL ABDOMEN
LOCATION DETAILED: MIDDLE STERNUM
LOCATION DETAILED: RIGHT LATERAL SCLERA
LOCATION DETAILED: RIGHT INFERIOR CENTRAL MALAR CHEEK
LOCATION DETAILED: RIGHT VENTRAL DISTAL FOREARM
LOCATION DETAILED: RIGHT PROXIMAL PRETIBIAL REGION
LOCATION DETAILED: LEFT ANTERIOR PROXIMAL UPPER ARM
LOCATION DETAILED: RIGHT MEDIAL UPPER BACK
LOCATION DETAILED: RIGHT ANTERIOR PROXIMAL UPPER ARM
LOCATION DETAILED: LEFT THUMBNAIL
LOCATION DETAILED: RIGHT LATERAL SUPERIOR CHEST
LOCATION DETAILED: RIGHT KNEE
LOCATION DETAILED: RIGHT ANTERIOR DISTAL UPPER ARM
LOCATION DETAILED: LEFT MID PREAURICULAR CHEEK
LOCATION DETAILED: LEFT ANTERIOR DISTAL UPPER ARM
LOCATION DETAILED: RIGHT MID-UPPER BACK
LOCATION DETAILED: LEFT IRIS
LOCATION DETAILED: RIGHT ANTERIOR SHOULDER
LOCATION DETAILED: RIGHT SUPERIOR HELIX
LOCATION DETAILED: LEFT MEDIAL FOREHEAD
LOCATION DETAILED: STERNAL NOTCH
LOCATION DETAILED: LEFT INFERIOR LATERAL MALAR CHEEK
LOCATION DETAILED: LEFT ANTERIOR LATERAL PROXIMAL UPPER ARM
LOCATION DETAILED: UPPER STERNUM
LOCATION DETAILED: EPIGASTRIC SKIN

## 2023-11-29 ASSESSMENT — LOCATION SIMPLE DESCRIPTION DERM
LOCATION SIMPLE: NECK
LOCATION SIMPLE: LEFT THUMBNAIL
LOCATION SIMPLE: LEFT EYE
LOCATION SIMPLE: RIGHT FOREARM
LOCATION SIMPLE: RIGHT EYE
LOCATION SIMPLE: RIGHT SHOULDER
LOCATION SIMPLE: RIGHT PRETIBIAL REGION
LOCATION SIMPLE: LEFT UPPER ARM
LOCATION SIMPLE: LEFT CHEEK
LOCATION SIMPLE: LEFT PRETIBIAL REGION
LOCATION SIMPLE: RIGHT UPPER ARM
LOCATION SIMPLE: LEFT FOREHEAD
LOCATION SIMPLE: CHEST
LOCATION SIMPLE: RIGHT KNEE
LOCATION SIMPLE: RIGHT UPPER BACK
LOCATION SIMPLE: RIGHT CHEEK
LOCATION SIMPLE: RIGHT EAR
LOCATION SIMPLE: LEFT UPPER BACK
LOCATION SIMPLE: ABDOMEN

## 2023-11-29 NOTE — PROCEDURE: COUNSELING
Detail Level: Generalized
Detail Level: Detailed
Detail Level: Zone
Sunscreen Recommendations: spf 30 broad spectrum, reapply q 90 min
Detail Level: Simple
Patient Specific Counseling (Will Not Stick From Patient To Patient): Refer to hand surgeon

## 2023-11-30 RX ORDER — CIPROFLOXACIN 0.5 MG/.25ML
SOLUTION/ DROPS AURICULAR (OTIC)
Qty: 1 | Refills: 2 | Status: CANCELLED
Stop reason: ENTERED-IN-ERROR

## 2024-05-28 ENCOUNTER — APPOINTMENT (RX ONLY)
Dept: URBAN - METROPOLITAN AREA CLINIC 176 | Facility: CLINIC | Age: 74
Setting detail: DERMATOLOGY
End: 2024-05-28

## 2024-05-28 VITALS — HEIGHT: 62 IN | WEIGHT: 120 LBS

## 2024-05-28 DIAGNOSIS — L81.4 OTHER MELANIN HYPERPIGMENTATION: ICD-10-CM

## 2024-05-28 DIAGNOSIS — D17 BENIGN LIPOMATOUS NEOPLASM: ICD-10-CM

## 2024-05-28 DIAGNOSIS — L71.8 OTHER ROSACEA: ICD-10-CM

## 2024-05-28 DIAGNOSIS — Z85.828 PERSONAL HISTORY OF OTHER MALIGNANT NEOPLASM OF SKIN: ICD-10-CM

## 2024-05-28 DIAGNOSIS — I78.8 OTHER DISEASES OF CAPILLARIES: ICD-10-CM

## 2024-05-28 DIAGNOSIS — L72.0 EPIDERMAL CYST: ICD-10-CM

## 2024-05-28 DIAGNOSIS — L57.8 OTHER SKIN CHANGES DUE TO CHRONIC EXPOSURE TO NONIONIZING RADIATION: ICD-10-CM

## 2024-05-28 DIAGNOSIS — L82.1 OTHER SEBORRHEIC KERATOSIS: ICD-10-CM

## 2024-05-28 DIAGNOSIS — L70.8 OTHER ACNE: ICD-10-CM

## 2024-05-28 DIAGNOSIS — D22 MELANOCYTIC NEVI: ICD-10-CM

## 2024-05-28 DIAGNOSIS — D18.0 HEMANGIOMA: ICD-10-CM

## 2024-05-28 PROBLEM — D22.5 MELANOCYTIC NEVI OF TRUNK: Status: ACTIVE | Noted: 2024-05-28

## 2024-05-28 PROBLEM — D23.5 OTHER BENIGN NEOPLASM OF SKIN OF TRUNK: Status: ACTIVE | Noted: 2024-05-28

## 2024-05-28 PROBLEM — D17.21 BENIGN LIPOMATOUS NEOPLASM OF SKIN AND SUBCUTANEOUS TISSUE OF RIGHT ARM: Status: ACTIVE | Noted: 2024-05-28

## 2024-05-28 PROBLEM — D18.01 HEMANGIOMA OF SKIN AND SUBCUTANEOUS TISSUE: Status: ACTIVE | Noted: 2024-05-28

## 2024-05-28 PROCEDURE — 99213 OFFICE O/P EST LOW 20 MIN: CPT

## 2024-05-28 PROCEDURE — ? TREATMENT REGIMEN

## 2024-05-28 PROCEDURE — ? PRESCRIPTION

## 2024-05-28 PROCEDURE — ? COUNSELING

## 2024-05-28 RX ORDER — DOXYCYCLINE HYCLATE 50 MG/1
TABLET, FILM COATED ORAL
Qty: 90 | Refills: 4 | Status: ERX | COMMUNITY
Start: 2024-05-28

## 2024-05-28 RX ADMIN — DOXYCYCLINE HYCLATE: 50 TABLET, FILM COATED ORAL at 00:00

## 2024-05-28 ASSESSMENT — LOCATION SIMPLE DESCRIPTION DERM
LOCATION SIMPLE: LEFT UPPER BACK
LOCATION SIMPLE: RIGHT EYE
LOCATION SIMPLE: ABDOMEN
LOCATION SIMPLE: LEFT EYE
LOCATION SIMPLE: RIGHT KNEE
LOCATION SIMPLE: LEFT CHEEK
LOCATION SIMPLE: LEFT FOREHEAD
LOCATION SIMPLE: RIGHT EAR
LOCATION SIMPLE: LEFT ANTERIOR NECK
LOCATION SIMPLE: LEFT PRETIBIAL REGION
LOCATION SIMPLE: LEFT UPPER ARM
LOCATION SIMPLE: NECK
LOCATION SIMPLE: RIGHT CHEEK
LOCATION SIMPLE: CHEST
LOCATION SIMPLE: RIGHT UPPER ARM
LOCATION SIMPLE: RIGHT SHOULDER
LOCATION SIMPLE: RIGHT UPPER BACK
LOCATION SIMPLE: RIGHT PRETIBIAL REGION
LOCATION SIMPLE: RIGHT ANTERIOR NECK

## 2024-05-28 ASSESSMENT — LOCATION ZONE DERM
LOCATION ZONE: TRUNK
LOCATION ZONE: EAR
LOCATION ZONE: FACE
LOCATION ZONE: CONJUNCTIVA
LOCATION ZONE: ARM
LOCATION ZONE: LEG
LOCATION ZONE: NECK
LOCATION ZONE: CORNEA

## 2024-05-28 ASSESSMENT — LOCATION DETAILED DESCRIPTION DERM
LOCATION DETAILED: UPPER STERNUM
LOCATION DETAILED: RIGHT ANTERIOR DISTAL UPPER ARM
LOCATION DETAILED: LEFT IRIS
LOCATION DETAILED: RIGHT CENTRAL LATERAL NECK
LOCATION DETAILED: STERNAL NOTCH
LOCATION DETAILED: RIGHT SUPERIOR MEDIAL UPPER BACK
LOCATION DETAILED: LEFT PROXIMAL PRETIBIAL REGION
LOCATION DETAILED: RIGHT PROXIMAL PRETIBIAL REGION
LOCATION DETAILED: LEFT SUPERIOR UPPER BACK
LOCATION DETAILED: LEFT ANTERIOR LATERAL PROXIMAL UPPER ARM
LOCATION DETAILED: RIGHT RIB CAGE
LOCATION DETAILED: RIGHT LATERAL UPPER BACK
LOCATION DETAILED: LEFT ANTERIOR DISTAL UPPER ARM
LOCATION DETAILED: MIDDLE STERNUM
LOCATION DETAILED: RIGHT SUPERIOR HELIX
LOCATION DETAILED: RIGHT MEDIAL UPPER BACK
LOCATION DETAILED: RIGHT KNEE
LOCATION DETAILED: RIGHT ANTERIOR PROXIMAL UPPER ARM
LOCATION DETAILED: RIGHT CLAVICULAR NECK
LOCATION DETAILED: RIGHT ANTERIOR SHOULDER
LOCATION DETAILED: LEFT MID PREAURICULAR CHEEK
LOCATION DETAILED: EPIGASTRIC SKIN
LOCATION DETAILED: LEFT ANTERIOR PROXIMAL UPPER ARM
LOCATION DETAILED: RIGHT LATERAL SUPERIOR CHEST
LOCATION DETAILED: RIGHT INFERIOR CENTRAL MALAR CHEEK
LOCATION DETAILED: RIGHT LATERAL SCLERA
LOCATION DETAILED: RIGHT MID-UPPER BACK
LOCATION DETAILED: LEFT INFERIOR LATERAL NECK
LOCATION DETAILED: LEFT INFERIOR CENTRAL MALAR CHEEK
LOCATION DETAILED: LEFT MEDIAL FOREHEAD
LOCATION DETAILED: LEFT LATERAL ABDOMEN
LOCATION DETAILED: LEFT INFERIOR LATERAL MALAR CHEEK

## 2024-05-28 NOTE — PROCEDURE: TREATMENT REGIMEN
Plan: Offered compound medication for redness for $50 but patient deferred. \\nDiscussed the common triggers of rosacea. She notices that when she cleans the bathroom with bleach, it triggers her rosacea. \\nPatient states that when she was diagnosed with rosacea and taking the mediation it has effected her blood pressure.\\nPatient takes doxy when needed. Also reviewed that when taking abx’s longterm, it can trigger abx resistance which is why compound was suggested.
Detail Level: Zone
Continue Regimen: Doxycycline PRN

## 2024-11-18 ENCOUNTER — APPOINTMENT (RX ONLY)
Dept: URBAN - METROPOLITAN AREA CLINIC 176 | Facility: CLINIC | Age: 74
Setting detail: DERMATOLOGY
End: 2024-11-18

## 2024-11-18 VITALS — WEIGHT: 120 LBS | HEIGHT: 62.5 IN

## 2024-11-18 DIAGNOSIS — L71.8 OTHER ROSACEA: ICD-10-CM

## 2024-11-18 DIAGNOSIS — L70.8 OTHER ACNE: ICD-10-CM

## 2024-11-18 DIAGNOSIS — D22 MELANOCYTIC NEVI: ICD-10-CM

## 2024-11-18 DIAGNOSIS — L82.1 OTHER SEBORRHEIC KERATOSIS: ICD-10-CM

## 2024-11-18 DIAGNOSIS — L72.0 EPIDERMAL CYST: ICD-10-CM

## 2024-11-18 DIAGNOSIS — D18.0 HEMANGIOMA: ICD-10-CM

## 2024-11-18 DIAGNOSIS — D17 BENIGN LIPOMATOUS NEOPLASM: ICD-10-CM

## 2024-11-18 DIAGNOSIS — L57.8 OTHER SKIN CHANGES DUE TO CHRONIC EXPOSURE TO NONIONIZING RADIATION: ICD-10-CM

## 2024-11-18 DIAGNOSIS — I78.8 OTHER DISEASES OF CAPILLARIES: ICD-10-CM

## 2024-11-18 DIAGNOSIS — L81.4 OTHER MELANIN HYPERPIGMENTATION: ICD-10-CM

## 2024-11-18 DIAGNOSIS — Z85.828 PERSONAL HISTORY OF OTHER MALIGNANT NEOPLASM OF SKIN: ICD-10-CM

## 2024-11-18 PROBLEM — D23.5 OTHER BENIGN NEOPLASM OF SKIN OF TRUNK: Status: ACTIVE | Noted: 2024-11-18

## 2024-11-18 PROBLEM — D22.5 MELANOCYTIC NEVI OF TRUNK: Status: ACTIVE | Noted: 2024-11-18

## 2024-11-18 PROBLEM — D18.01 HEMANGIOMA OF SKIN AND SUBCUTANEOUS TISSUE: Status: ACTIVE | Noted: 2024-11-18

## 2024-11-18 PROBLEM — D17.21 BENIGN LIPOMATOUS NEOPLASM OF SKIN AND SUBCUTANEOUS TISSUE OF RIGHT ARM: Status: ACTIVE | Noted: 2024-11-18

## 2024-11-18 PROCEDURE — ? PRESCRIPTION

## 2024-11-18 PROCEDURE — 99213 OFFICE O/P EST LOW 20 MIN: CPT

## 2024-11-18 PROCEDURE — ? COUNSELING

## 2024-11-18 PROCEDURE — ? TREATMENT REGIMEN

## 2024-11-18 PROCEDURE — ? OBSERVATION AND MEASURE

## 2024-11-18 RX ORDER — DOXYCYCLINE HYCLATE 50 MG/1
TABLET, FILM COATED ORAL
Qty: 90 | Refills: 3 | Status: ERX | COMMUNITY
Start: 2024-11-18

## 2024-11-18 RX ADMIN — DOXYCYCLINE HYCLATE: 50 TABLET, FILM COATED ORAL at 00:00

## 2024-11-18 ASSESSMENT — LOCATION SIMPLE DESCRIPTION DERM
LOCATION SIMPLE: NECK
LOCATION SIMPLE: RIGHT CHEEK
LOCATION SIMPLE: RIGHT EAR
LOCATION SIMPLE: LEFT CHEEK
LOCATION SIMPLE: LEFT EYE
LOCATION SIMPLE: LEFT FOREHEAD
LOCATION SIMPLE: RIGHT KNEE
LOCATION SIMPLE: RIGHT UPPER ARM
LOCATION SIMPLE: LEFT ANTERIOR NECK
LOCATION SIMPLE: CHEST
LOCATION SIMPLE: RIGHT EYE
LOCATION SIMPLE: RIGHT PRETIBIAL REGION
LOCATION SIMPLE: LEFT PRETIBIAL REGION
LOCATION SIMPLE: LEFT UPPER BACK
LOCATION SIMPLE: LEFT UPPER ARM
LOCATION SIMPLE: RIGHT UPPER BACK
LOCATION SIMPLE: ABDOMEN
LOCATION SIMPLE: RIGHT ANTERIOR NECK
LOCATION SIMPLE: RIGHT SHOULDER

## 2024-11-18 ASSESSMENT — LOCATION DETAILED DESCRIPTION DERM
LOCATION DETAILED: RIGHT LATERAL UPPER BACK
LOCATION DETAILED: LEFT ANTERIOR DISTAL UPPER ARM
LOCATION DETAILED: LEFT LATERAL ABDOMEN
LOCATION DETAILED: LEFT MEDIAL FOREHEAD
LOCATION DETAILED: LEFT MID PREAURICULAR CHEEK
LOCATION DETAILED: EPIGASTRIC SKIN
LOCATION DETAILED: LEFT PROXIMAL PRETIBIAL REGION
LOCATION DETAILED: RIGHT SUPERIOR MEDIAL UPPER BACK
LOCATION DETAILED: RIGHT MID-UPPER BACK
LOCATION DETAILED: RIGHT INFERIOR CENTRAL MALAR CHEEK
LOCATION DETAILED: RIGHT RIB CAGE
LOCATION DETAILED: LEFT ANTERIOR LATERAL PROXIMAL UPPER ARM
LOCATION DETAILED: RIGHT KNEE
LOCATION DETAILED: LEFT INFERIOR LATERAL MALAR CHEEK
LOCATION DETAILED: LEFT ANTERIOR PROXIMAL UPPER ARM
LOCATION DETAILED: LEFT INFERIOR CENTRAL MALAR CHEEK
LOCATION DETAILED: RIGHT MEDIAL UPPER BACK
LOCATION DETAILED: RIGHT PROXIMAL PRETIBIAL REGION
LOCATION DETAILED: STERNAL NOTCH
LOCATION DETAILED: RIGHT LATERAL SUPERIOR CHEST
LOCATION DETAILED: RIGHT CENTRAL LATERAL NECK
LOCATION DETAILED: LEFT INFERIOR LATERAL NECK
LOCATION DETAILED: MIDDLE STERNUM
LOCATION DETAILED: RIGHT LATERAL SCLERA
LOCATION DETAILED: RIGHT ANTERIOR DISTAL UPPER ARM
LOCATION DETAILED: RIGHT SUPERIOR HELIX
LOCATION DETAILED: RIGHT ANTERIOR PROXIMAL UPPER ARM
LOCATION DETAILED: LEFT IRIS
LOCATION DETAILED: LEFT SUPERIOR UPPER BACK
LOCATION DETAILED: RIGHT ANTERIOR SHOULDER
LOCATION DETAILED: UPPER STERNUM
LOCATION DETAILED: RIGHT CLAVICULAR NECK

## 2024-11-18 ASSESSMENT — LOCATION ZONE DERM
LOCATION ZONE: CONJUNCTIVA
LOCATION ZONE: LEG
LOCATION ZONE: TRUNK
LOCATION ZONE: NECK
LOCATION ZONE: EAR
LOCATION ZONE: CORNEA
LOCATION ZONE: FACE
LOCATION ZONE: ARM

## 2024-11-18 NOTE — PROCEDURE: TREATMENT REGIMEN
Plan: Offered compound medication for redness for $50 but patient deferred.
Detail Level: Zone
Continue Regimen: Doxycycline PRN